# Patient Record
Sex: FEMALE | Race: WHITE | Employment: UNEMPLOYED | URBAN - METROPOLITAN AREA
[De-identification: names, ages, dates, MRNs, and addresses within clinical notes are randomized per-mention and may not be internally consistent; named-entity substitution may affect disease eponyms.]

---

## 2023-10-08 ENCOUNTER — HOSPITAL ENCOUNTER (EMERGENCY)
Age: 23
Discharge: HOME | End: 2023-10-08

## 2023-10-08 VITALS
RESPIRATION RATE: 16 BRPM | SYSTOLIC BLOOD PRESSURE: 135 MMHG | OXYGEN SATURATION: 100 % | BODY MASS INDEX: 28.25 KG/M2 | HEIGHT: 67 IN | TEMPERATURE: 98.7 F | HEART RATE: 100 BPM | DIASTOLIC BLOOD PRESSURE: 96 MMHG | WEIGHT: 180 LBS

## 2023-10-08 DIAGNOSIS — K08.89 PAIN, DENTAL: Primary | ICD-10-CM

## 2023-10-08 DIAGNOSIS — K02.9 DENTAL CARIES: ICD-10-CM

## 2023-10-08 PROCEDURE — 99283 EMERGENCY DEPT VISIT LOW MDM: CPT

## 2023-10-08 PROCEDURE — 6370000000 HC RX 637 (ALT 250 FOR IP)

## 2023-10-08 RX ORDER — HYDROCODONE BITARTRATE AND ACETAMINOPHEN 5; 325 MG/1; MG/1
1 TABLET ORAL EVERY 8 HOURS PRN
Qty: 10 TABLET | Refills: 0 | Status: SHIPPED | OUTPATIENT
Start: 2023-10-08 | End: 2023-10-11

## 2023-10-08 RX ORDER — PENICILLIN V POTASSIUM 250 MG/1
500 TABLET ORAL ONCE
Status: COMPLETED | OUTPATIENT
Start: 2023-10-08 | End: 2023-10-08

## 2023-10-08 RX ORDER — HYDROCODONE BITARTRATE AND ACETAMINOPHEN 5; 325 MG/1; MG/1
1 TABLET ORAL ONCE
Status: COMPLETED | OUTPATIENT
Start: 2023-10-08 | End: 2023-10-08

## 2023-10-08 RX ORDER — PENICILLIN V POTASSIUM 500 MG/1
500 TABLET ORAL 4 TIMES DAILY
Qty: 28 TABLET | Refills: 0 | Status: SHIPPED | OUTPATIENT
Start: 2023-10-08 | End: 2023-10-15

## 2023-10-08 RX ADMIN — PENICILLIN V POTASSIUM 500 MG: 250 TABLET, FILM COATED ORAL at 17:24

## 2023-10-08 RX ADMIN — HYDROCODONE BITARTRATE AND ACETAMINOPHEN 1 TABLET: 5; 325 TABLET ORAL at 17:24

## 2023-10-08 ASSESSMENT — PAIN SCALES - GENERAL
PAINLEVEL_OUTOF10: 6
PAINLEVEL_OUTOF10: 10
PAINLEVEL_OUTOF10: 9

## 2023-10-08 ASSESSMENT — PAIN DESCRIPTION - ONSET: ONSET: ON-GOING

## 2023-10-08 ASSESSMENT — PAIN DESCRIPTION - ORIENTATION: ORIENTATION: RIGHT;LEFT

## 2023-10-08 ASSESSMENT — PAIN DESCRIPTION - DESCRIPTORS
DESCRIPTORS: SHARP
DESCRIPTORS: ACHING

## 2023-10-08 ASSESSMENT — PAIN - FUNCTIONAL ASSESSMENT
PAIN_FUNCTIONAL_ASSESSMENT: 0-10
PAIN_FUNCTIONAL_ASSESSMENT: 0-10

## 2023-10-08 ASSESSMENT — ENCOUNTER SYMPTOMS
ABDOMINAL PAIN: 0
BACK PAIN: 0
SHORTNESS OF BREATH: 0
SORE THROAT: 0
NAUSEA: 0
COUGH: 0
VOMITING: 0
DIARRHEA: 0

## 2023-10-08 ASSESSMENT — PAIN DESCRIPTION - LOCATION
LOCATION: TEETH
LOCATION: TEETH
LOCATION: JAW

## 2023-10-08 ASSESSMENT — PAIN DESCRIPTION - FREQUENCY: FREQUENCY: CONTINUOUS

## 2023-10-08 ASSESSMENT — PAIN DESCRIPTION - PAIN TYPE: TYPE: ACUTE PAIN

## 2023-10-08 ASSESSMENT — LIFESTYLE VARIABLES
HOW OFTEN DO YOU HAVE A DRINK CONTAINING ALCOHOL: MONTHLY OR LESS
HOW MANY STANDARD DRINKS CONTAINING ALCOHOL DO YOU HAVE ON A TYPICAL DAY: 1 OR 2

## 2023-10-08 NOTE — DISCHARGE INSTRUCTIONS
Follow-up with dental specialist.  Return to emergency department for any new or worsening symptoms.

## 2023-10-08 NOTE — ED TRIAGE NOTES
Pt presents with oral pain/swelling x 2 weeks. Pt has been alternating tylenol/motrin for pain with no symptom relief. Pt tearful and describes the pain as shooting that is aggravated with heat/cold. Pt concerned for dental abcess because of gum swelling, however, reports no drainage, fever, or history. Yasmin Mckay

## 2023-10-08 NOTE — ED NOTES
Nursing Adult Assessment    General Appearance  [x] Facial Expressions, extremities, & body posture are relaxed. [] Exceptions:    Cognitive  [x] Alert, make eye contact when prompted. [x] Oriented to person, place, & situation. [] Exceptions:    Respiratory  [x] Unlabored breathing   [x] Speaks in clear and complete sentences   [x] Chest expansion is symmetrical with breaths   [x] Breath sounds are clear bilaterally. [x] Exceptions:Bilateral dental pain with minor swelling. Cardiovascular  [x] Regular apical heart sounds   [x] Peripheral pulses are palpable   [x] Capillary refill < 3 seconds in all extremities. [] Exceptions:    Abdomen  [x] Non-tender   [x] Non-distended   [x] Bowel sounds x4 quadrants.  [] Exceptions:    Skin  [x] Color appropriate for ethnicity   [x] No rash or discoloration present at the area(s) of complaint   [x] Warm and dry to touch. [] Exceptions:   [x] Non-tender   [x] Normal range of motion   [x] Normal sensation   [x] Normal Appearance, No Edema.   [] Exceptions:      Madelaine Salgado RN  10/08/23 9900

## 2023-12-30 ENCOUNTER — APPOINTMENT (OUTPATIENT)
Dept: RADIOLOGY | Facility: HOSPITAL | Age: 23
End: 2023-12-30
Payer: MEDICAID

## 2023-12-30 ENCOUNTER — HOSPITAL ENCOUNTER (EMERGENCY)
Facility: HOSPITAL | Age: 23
Discharge: HOME | End: 2023-12-30
Payer: MEDICAID

## 2023-12-30 VITALS
TEMPERATURE: 98.2 F | RESPIRATION RATE: 18 BRPM | HEART RATE: 89 BPM | BODY MASS INDEX: 29.19 KG/M2 | OXYGEN SATURATION: 97 % | HEIGHT: 67 IN | SYSTOLIC BLOOD PRESSURE: 127 MMHG | DIASTOLIC BLOOD PRESSURE: 63 MMHG | WEIGHT: 186 LBS

## 2023-12-30 DIAGNOSIS — N30.01 ACUTE CYSTITIS WITH HEMATURIA: Primary | ICD-10-CM

## 2023-12-30 DIAGNOSIS — R10.9 ACUTE RIGHT FLANK PAIN: ICD-10-CM

## 2023-12-30 LAB
ALBUMIN SERPL BCP-MCNC: 4.4 G/DL (ref 3.4–5)
ALP SERPL-CCNC: 61 U/L (ref 33–110)
ALT SERPL W P-5'-P-CCNC: 7 U/L (ref 7–45)
ANION GAP SERPL CALC-SCNC: 13 MMOL/L (ref 10–20)
APPEARANCE UR: ABNORMAL
AST SERPL W P-5'-P-CCNC: 13 U/L (ref 9–39)
BACTERIA #/AREA URNS AUTO: ABNORMAL /HPF
BASOPHILS # BLD AUTO: 0.02 X10*3/UL (ref 0–0.1)
BASOPHILS NFR BLD AUTO: 0.3 %
BILIRUB SERPL-MCNC: 0.4 MG/DL (ref 0–1.2)
BILIRUB UR STRIP.AUTO-MCNC: NEGATIVE MG/DL
BUN SERPL-MCNC: 10 MG/DL (ref 6–23)
CALCIUM SERPL-MCNC: 9 MG/DL (ref 8.6–10.3)
CHLORIDE SERPL-SCNC: 100 MMOL/L (ref 98–107)
CO2 SERPL-SCNC: 26 MMOL/L (ref 21–32)
COLOR UR: YELLOW
CREAT SERPL-MCNC: 0.85 MG/DL (ref 0.5–1.05)
EOSINOPHIL # BLD AUTO: 0.02 X10*3/UL (ref 0–0.7)
EOSINOPHIL NFR BLD AUTO: 0.3 %
ERYTHROCYTE [DISTWIDTH] IN BLOOD BY AUTOMATED COUNT: 12 % (ref 11.5–14.5)
GFR SERPL CREATININE-BSD FRML MDRD: >90 ML/MIN/1.73M*2
GLUCOSE SERPL-MCNC: 96 MG/DL (ref 74–99)
GLUCOSE UR STRIP.AUTO-MCNC: NEGATIVE MG/DL
HCG UR QL IA.RAPID: NEGATIVE
HCT VFR BLD AUTO: 35 % (ref 36–46)
HGB BLD-MCNC: 12.5 G/DL (ref 12–16)
IMM GRANULOCYTES # BLD AUTO: 0.02 X10*3/UL (ref 0–0.7)
IMM GRANULOCYTES NFR BLD AUTO: 0.3 % (ref 0–0.9)
KETONES UR STRIP.AUTO-MCNC: ABNORMAL MG/DL
LEUKOCYTE ESTERASE UR QL STRIP.AUTO: ABNORMAL
LYMPHOCYTES # BLD AUTO: 1.97 X10*3/UL (ref 1.2–4.8)
LYMPHOCYTES NFR BLD AUTO: 25.6 %
MCH RBC QN AUTO: 33.1 PG (ref 26–34)
MCHC RBC AUTO-ENTMCNC: 35.7 G/DL (ref 32–36)
MCV RBC AUTO: 93 FL (ref 80–100)
MONOCYTES # BLD AUTO: 0.93 X10*3/UL (ref 0.1–1)
MONOCYTES NFR BLD AUTO: 12.1 %
MUCOUS THREADS #/AREA URNS AUTO: ABNORMAL /LPF
NEUTROPHILS # BLD AUTO: 4.73 X10*3/UL (ref 1.2–7.7)
NEUTROPHILS NFR BLD AUTO: 61.4 %
NITRITE UR QL STRIP.AUTO: POSITIVE
NRBC BLD-RTO: 0 /100 WBCS (ref 0–0)
PH UR STRIP.AUTO: 6 [PH]
PLATELET # BLD AUTO: 233 X10*3/UL (ref 150–450)
POTASSIUM SERPL-SCNC: 3.3 MMOL/L (ref 3.5–5.3)
PROT SERPL-MCNC: 8.1 G/DL (ref 6.4–8.2)
PROT UR STRIP.AUTO-MCNC: ABNORMAL MG/DL
RBC # BLD AUTO: 3.78 X10*6/UL (ref 4–5.2)
RBC # UR STRIP.AUTO: ABNORMAL /UL
RBC #/AREA URNS AUTO: ABNORMAL /HPF
SODIUM SERPL-SCNC: 136 MMOL/L (ref 136–145)
SP GR UR STRIP.AUTO: 1.01
SQUAMOUS #/AREA URNS AUTO: ABNORMAL /HPF
UROBILINOGEN UR STRIP.AUTO-MCNC: 4 MG/DL
WBC # BLD AUTO: 7.7 X10*3/UL (ref 4.4–11.3)
WBC #/AREA URNS AUTO: >50 /HPF
WBC CLUMPS #/AREA URNS AUTO: ABNORMAL /HPF

## 2023-12-30 PROCEDURE — 96365 THER/PROPH/DIAG IV INF INIT: CPT

## 2023-12-30 PROCEDURE — 81001 URINALYSIS AUTO W/SCOPE: CPT | Performed by: PHYSICIAN ASSISTANT

## 2023-12-30 PROCEDURE — 96361 HYDRATE IV INFUSION ADD-ON: CPT

## 2023-12-30 PROCEDURE — 96375 TX/PRO/DX INJ NEW DRUG ADDON: CPT

## 2023-12-30 PROCEDURE — 85025 COMPLETE CBC W/AUTO DIFF WBC: CPT | Performed by: PHYSICIAN ASSISTANT

## 2023-12-30 PROCEDURE — 81025 URINE PREGNANCY TEST: CPT | Performed by: PHYSICIAN ASSISTANT

## 2023-12-30 PROCEDURE — 36415 COLL VENOUS BLD VENIPUNCTURE: CPT | Performed by: PHYSICIAN ASSISTANT

## 2023-12-30 PROCEDURE — 74176 CT ABD & PELVIS W/O CONTRAST: CPT | Performed by: RADIOLOGY

## 2023-12-30 PROCEDURE — 99284 EMERGENCY DEPT VISIT MOD MDM: CPT | Mod: 25

## 2023-12-30 PROCEDURE — 2500000004 HC RX 250 GENERAL PHARMACY W/ HCPCS (ALT 636 FOR OP/ED): Performed by: PHYSICIAN ASSISTANT

## 2023-12-30 PROCEDURE — 87086 URINE CULTURE/COLONY COUNT: CPT | Mod: ELYLAB | Performed by: PHYSICIAN ASSISTANT

## 2023-12-30 PROCEDURE — 80053 COMPREHEN METABOLIC PANEL: CPT | Performed by: PHYSICIAN ASSISTANT

## 2023-12-30 PROCEDURE — 74176 CT ABD & PELVIS W/O CONTRAST: CPT

## 2023-12-30 RX ORDER — MORPHINE SULFATE 4 MG/ML
4 INJECTION, SOLUTION INTRAMUSCULAR; INTRAVENOUS ONCE
Status: COMPLETED | OUTPATIENT
Start: 2023-12-30 | End: 2023-12-30

## 2023-12-30 RX ORDER — CEFTRIAXONE 1 G/50ML
1 INJECTION, SOLUTION INTRAVENOUS ONCE
Status: COMPLETED | OUTPATIENT
Start: 2023-12-30 | End: 2023-12-30

## 2023-12-30 RX ORDER — CEPHALEXIN 500 MG/1
500 CAPSULE ORAL 4 TIMES DAILY
Qty: 28 CAPSULE | Refills: 0 | Status: SHIPPED | OUTPATIENT
Start: 2023-12-30 | End: 2024-01-06

## 2023-12-30 RX ORDER — ONDANSETRON HYDROCHLORIDE 2 MG/ML
4 INJECTION, SOLUTION INTRAVENOUS ONCE
Status: COMPLETED | OUTPATIENT
Start: 2023-12-30 | End: 2023-12-30

## 2023-12-30 RX ORDER — KETOROLAC TROMETHAMINE 30 MG/ML
15 INJECTION, SOLUTION INTRAMUSCULAR; INTRAVENOUS ONCE
Status: COMPLETED | OUTPATIENT
Start: 2023-12-30 | End: 2023-12-30

## 2023-12-30 RX ORDER — NAPROXEN 500 MG/1
500 TABLET ORAL
Qty: 30 TABLET | Refills: 0 | Status: SHIPPED | OUTPATIENT
Start: 2023-12-30 | End: 2024-01-14

## 2023-12-30 RX ADMIN — CEFTRIAXONE SODIUM 1 G: 1 INJECTION, SOLUTION INTRAVENOUS at 21:31

## 2023-12-30 RX ADMIN — MORPHINE SULFATE 4 MG: 4 INJECTION, SOLUTION INTRAMUSCULAR; INTRAVENOUS at 20:25

## 2023-12-30 RX ADMIN — KETOROLAC TROMETHAMINE 15 MG: 30 INJECTION, SOLUTION INTRAMUSCULAR; INTRAVENOUS at 21:31

## 2023-12-30 RX ADMIN — ONDANSETRON 4 MG: 2 INJECTION INTRAMUSCULAR; INTRAVENOUS at 20:26

## 2023-12-30 RX ADMIN — SODIUM CHLORIDE 1000 ML: 9 INJECTION, SOLUTION INTRAVENOUS at 21:32

## 2023-12-30 RX ADMIN — SODIUM CHLORIDE, POTASSIUM CHLORIDE, SODIUM LACTATE AND CALCIUM CHLORIDE 1000 ML: 600; 310; 30; 20 INJECTION, SOLUTION INTRAVENOUS at 20:26

## 2023-12-30 ASSESSMENT — COLUMBIA-SUICIDE SEVERITY RATING SCALE - C-SSRS
1. IN THE PAST MONTH, HAVE YOU WISHED YOU WERE DEAD OR WISHED YOU COULD GO TO SLEEP AND NOT WAKE UP?: NO
6. HAVE YOU EVER DONE ANYTHING, STARTED TO DO ANYTHING, OR PREPARED TO DO ANYTHING TO END YOUR LIFE?: NO
2. HAVE YOU ACTUALLY HAD ANY THOUGHTS OF KILLING YOURSELF?: NO

## 2023-12-30 ASSESSMENT — PAIN DESCRIPTION - PAIN TYPE: TYPE: ACUTE PAIN

## 2023-12-30 ASSESSMENT — PAIN - FUNCTIONAL ASSESSMENT
PAIN_FUNCTIONAL_ASSESSMENT: 0-10
PAIN_FUNCTIONAL_ASSESSMENT: 0-10

## 2023-12-30 ASSESSMENT — PAIN DESCRIPTION - LOCATION
LOCATION: BACK
LOCATION: ABDOMEN

## 2023-12-30 ASSESSMENT — LIFESTYLE VARIABLES
REASON UNABLE TO ASSESS: NO
EVER FELT BAD OR GUILTY ABOUT YOUR DRINKING: NO
EVER HAD A DRINK FIRST THING IN THE MORNING TO STEADY YOUR NERVES TO GET RID OF A HANGOVER: NO
HAVE YOU EVER FELT YOU SHOULD CUT DOWN ON YOUR DRINKING: NO
HAVE PEOPLE ANNOYED YOU BY CRITICIZING YOUR DRINKING: NO

## 2023-12-30 ASSESSMENT — PAIN DESCRIPTION - ORIENTATION: ORIENTATION: RIGHT

## 2023-12-30 ASSESSMENT — PAIN SCALES - GENERAL
PAINLEVEL_OUTOF10: 7
PAINLEVEL_OUTOF10: 4

## 2023-12-30 ASSESSMENT — PAIN DESCRIPTION - DESCRIPTORS: DESCRIPTORS: DULL;SHARP

## 2023-12-31 LAB — HOLD SPECIMEN: NORMAL

## 2023-12-31 NOTE — ED PROVIDER NOTES
HPI   Chief Complaint   Patient presents with   • Back Pain     Patient complains of sharp right lower back pain for 1 week. Unknown is she injured it. Denies urinary issues.        The patient is a pleasant 23-year-old female presents emergency room with chief plaint of right flank pain that started 1 week ago.  Patient reports that the pain has been constant is described as sharp and stabbing it does get better with some ibuprofen and heating pad but never completely goes away.  Sometimes the patient is worse with movement of the times the patient states the pain is constant no matter what she does.  For the past 24 hours patient states the pain became so severe that she actually vomited several times.  The pain does not radiate more towards her right flank and towards the groin.  She denies any history of kidney stones, denies any gross hematuria, dysuria, frequency of urination, pyuria, vaginal bleeding or discharge.  She currently rates her flank pain 8 out of a 10.  She was driven the hospital by family member.  Her last dose of ibuprofen was earlier this morning.      History provided by:  Patient and medical records   used: No                        [place] Coma Scale Score: 15                  Patient History   History reviewed. No pertinent past medical history.  History reviewed. No pertinent surgical history.  No family history on file.  Social History     Tobacco Use   • Smoking status: Not on file   • Smokeless tobacco: Not on file   Substance Use Topics   • Alcohol use: Not on file   • Drug use: Not on file       Physical Exam   ED Triage Vitals [12/30/23 1902]   Temp Heart Rate Resp BP   36.8 °C (98.2 °F) 89 18 127/63      SpO2 Temp Source Heart Rate Source Patient Position   97 % Temporal Monitor Sitting      BP Location FiO2 (%)     Right arm --       Physical Exam  Vitals and nursing note reviewed.   Constitutional:       General: She is awake.      Appearance: Normal  appearance. She is well-developed and normal weight. She is not ill-appearing, toxic-appearing or diaphoretic.   HENT:      Head: Normocephalic and atraumatic.      Right Ear: Tympanic membrane, ear canal and external ear normal.      Left Ear: Tympanic membrane, ear canal and external ear normal.      Nose: Nose normal.      Mouth/Throat:      Mouth: Mucous membranes are moist.      Pharynx: Oropharynx is clear.   Eyes:      Extraocular Movements: Extraocular movements intact.      Conjunctiva/sclera: Conjunctivae normal.      Pupils: Pupils are equal, round, and reactive to light.   Cardiovascular:      Rate and Rhythm: Normal rate and regular rhythm.      Pulses: Normal pulses.      Heart sounds: Normal heart sounds.   Pulmonary:      Effort: Pulmonary effort is normal.      Breath sounds: Normal breath sounds. No wheezing, rhonchi or rales.   Abdominal:      General: Abdomen is flat. Bowel sounds are normal. There is no distension or abdominal bruit. There are no signs of injury.      Palpations: Abdomen is soft. There is no shifting dullness or mass.      Tenderness: There is abdominal tenderness in the right lower quadrant. There is right CVA tenderness. There is no guarding.       Musculoskeletal:         General: No swelling or tenderness. Normal range of motion.      Cervical back: Normal range of motion and neck supple.   Skin:     General: Skin is warm and dry.      Capillary Refill: Capillary refill takes less than 2 seconds.      Findings: No rash.   Neurological:      General: No focal deficit present.      Mental Status: She is alert and oriented to person, place, and time. Mental status is at baseline.   Psychiatric:         Mood and Affect: Mood normal.         Behavior: Behavior normal. Behavior is cooperative.         Thought Content: Thought content normal.         Judgment: Judgment normal.         ED Course & MDM   Diagnoses as of 12/30/23 2233   Acute cystitis with hematuria   Acute right  flank pain       Medical Decision Making  ED course: Initial vital signs temperature 36.8 pulse 89 respiratory 18 /63 pulse ox was 97% on room air patient was given a liter of LR, she received Zofran 4 mg IV push, morphine 4 mg IV push.    CBC shows a white count of 7.7 hemoglobin 12.5 hematocrit 35 platelet count was 233.  General chemistry potassium 3.3 otherwise unremarkable normal LFTs urine hCG was negative urinalysis shows hazy appearance moderate blood trace ketones 4 urobilinogen positive nitrite moderate leukocyte esterase greater than 50 white cells 4+ bacteria concerning for UTI to the 25 squamous epithelial cells.  The patient received a gram of Rocephin IV piggyback.  Noncontrast CT scan of the abdomen pelvis shows no acute abnormality in the abdomen or pelvis, no evidence of renal or ureteral calculus or evidence of hydroureteronephrosis.  I rounded on the patient to discuss results of the workup and repeat exam she is resting comfortably.  Initial impression is flank pain with a UTI she was discharged home with prescription for Keflex, naproxen and recommend close follow-up with her PCP.  She was advised to stay hydrated and encouraged to return with any concerns or worsening of symptoms all questions answered prior to discharge        Procedure  Procedures     Curt Montejo PA-C  12/30/23 5336

## 2024-01-02 LAB — BACTERIA UR CULT: ABNORMAL

## 2024-01-03 ENCOUNTER — TELEPHONE (OUTPATIENT)
Dept: PHARMACY | Facility: HOSPITAL | Age: 24
End: 2024-01-03
Payer: MEDICAID

## 2024-01-03 NOTE — PROGRESS NOTES
EDPD Note: Antibiotics Reviewed and Warranted    Contacted Ms. Nadiya Woodard regarding a positive Escherichia coli urine culture/result that was taken during their recent emergency room visit. I completed education with patient . The patient is being treated appropriately with cephalexin 500 m capsule PO QID x 7 days per the resulting urine culture. Patient presented to the ED with severe flank pain occurring over several days with vomiting. No other systemic symptoms noted. CT performed in the ED, and patient diagnosed with acute cystitis accompanied by right flank pain (no diagnosis of pyelonephritis given).     Patient indicated that they had started taking the prescribed antibiotic and symptoms are much better since leaving the ED. Denied worsening flank pain, nausea, vomiting, or any development of new systemic symptoms/urinary discomfort. Due to patient's drastic improvement in symptoms, antibiotic will be kept as prescribed. Patient had no further questions regarding their care, and declined further education from the pharmacy team. Patient to return to the ED or contact their PCP with worsening/changing symptoms. Additional prescriptions for naproxen 500 mg were also given at discharge from the ED.     Susceptibility data from last 90 days.  Collected Specimen Info Organism Amoxicillin/Clavulanate Ampicillin Ampicillin/Sulbactam Cefazolin Cefazolin (uncomplicated UTIs only) Ceftriaxone Ciprofloxacin Gentamicin Nitrofurantoin Piperacillin/Tazobactam   23 Urine from Clean Catch/Voided Escherichia coli S R R I S S S S S S     Collected Specimen Info Organism Trimethoprim/Sulfamethoxazole   23 Urine from Clean Catch/Voided Escherichia coli S        No further follow up needed from EDPD Team.     Jimena Dobbs, MarshalD

## 2024-02-14 ENCOUNTER — HOSPITAL ENCOUNTER (EMERGENCY)
Facility: HOSPITAL | Age: 24
Discharge: HOME | End: 2024-02-14
Attending: EMERGENCY MEDICINE
Payer: MEDICAID

## 2024-02-14 VITALS
WEIGHT: 160 LBS | HEART RATE: 88 BPM | BODY MASS INDEX: 25.11 KG/M2 | DIASTOLIC BLOOD PRESSURE: 78 MMHG | RESPIRATION RATE: 19 BRPM | TEMPERATURE: 97.9 F | HEIGHT: 67 IN | SYSTOLIC BLOOD PRESSURE: 138 MMHG | OXYGEN SATURATION: 97 %

## 2024-02-14 DIAGNOSIS — K02.9 PAIN DUE TO DENTAL CARIES: Primary | ICD-10-CM

## 2024-02-14 PROCEDURE — 2500000001 HC RX 250 WO HCPCS SELF ADMINISTERED DRUGS (ALT 637 FOR MEDICARE OP): Performed by: EMERGENCY MEDICINE

## 2024-02-14 PROCEDURE — 99283 EMERGENCY DEPT VISIT LOW MDM: CPT | Performed by: EMERGENCY MEDICINE

## 2024-02-14 RX ORDER — ACETAMINOPHEN 500 MG
1000 TABLET ORAL EVERY 8 HOURS PRN
Qty: 30 TABLET | Refills: 0 | Status: SHIPPED | OUTPATIENT
Start: 2024-02-14 | End: 2024-02-24

## 2024-02-14 RX ORDER — LIDOCAINE HYDROCHLORIDE 40 MG/ML
0.5 SOLUTION TOPICAL ONCE
Status: COMPLETED | OUTPATIENT
Start: 2024-02-14 | End: 2024-02-14

## 2024-02-14 RX ORDER — NAPROXEN 250 MG/1
500 TABLET ORAL ONCE
Status: COMPLETED | OUTPATIENT
Start: 2024-02-14 | End: 2024-02-14

## 2024-02-14 RX ORDER — LIDOCAINE HYDROCHLORIDE 20 MG/ML
1.25 SOLUTION OROPHARYNGEAL ONCE
Status: DISCONTINUED | OUTPATIENT
Start: 2024-02-14 | End: 2024-02-14

## 2024-02-14 RX ORDER — AMOXICILLIN AND CLAVULANATE POTASSIUM 875; 125 MG/1; MG/1
1 TABLET, FILM COATED ORAL EVERY 12 HOURS
Qty: 14 TABLET | Refills: 0 | Status: SHIPPED | OUTPATIENT
Start: 2024-02-14 | End: 2024-02-21

## 2024-02-14 RX ORDER — NAPROXEN SODIUM 550 MG/1
550 TABLET ORAL 2 TIMES DAILY PRN
Qty: 20 TABLET | Refills: 0 | Status: SHIPPED | OUTPATIENT
Start: 2024-02-14 | End: 2024-02-24

## 2024-02-14 RX ADMIN — LIDOCAINE HYDROCHLORIDE 0.5 ML: 40 SOLUTION ORAL at 16:47

## 2024-02-14 RX ADMIN — NAPROXEN 500 MG: 250 TABLET ORAL at 16:46

## 2024-02-14 ASSESSMENT — COLUMBIA-SUICIDE SEVERITY RATING SCALE - C-SSRS
6. HAVE YOU EVER DONE ANYTHING, STARTED TO DO ANYTHING, OR PREPARED TO DO ANYTHING TO END YOUR LIFE?: NO
1. IN THE PAST MONTH, HAVE YOU WISHED YOU WERE DEAD OR WISHED YOU COULD GO TO SLEEP AND NOT WAKE UP?: NO
2. HAVE YOU ACTUALLY HAD ANY THOUGHTS OF KILLING YOURSELF?: NO

## 2024-02-14 ASSESSMENT — PAIN DESCRIPTION - PAIN TYPE
TYPE: ACUTE PAIN
TYPE: ACUTE PAIN

## 2024-02-14 ASSESSMENT — LIFESTYLE VARIABLES
EVER HAD A DRINK FIRST THING IN THE MORNING TO STEADY YOUR NERVES TO GET RID OF A HANGOVER: NO
HAVE YOU EVER FELT YOU SHOULD CUT DOWN ON YOUR DRINKING: NO
HAVE PEOPLE ANNOYED YOU BY CRITICIZING YOUR DRINKING: NO
EVER FELT BAD OR GUILTY ABOUT YOUR DRINKING: NO

## 2024-02-14 ASSESSMENT — PAIN SCALES - GENERAL
PAINLEVEL_OUTOF10: 8
PAINLEVEL_OUTOF10: 5 - MODERATE PAIN

## 2024-02-14 ASSESSMENT — PAIN DESCRIPTION - LOCATION
LOCATION: JAW
LOCATION: TEETH

## 2024-02-14 ASSESSMENT — PAIN - FUNCTIONAL ASSESSMENT
PAIN_FUNCTIONAL_ASSESSMENT: 0-10
PAIN_FUNCTIONAL_ASSESSMENT: 0-10

## 2024-02-14 ASSESSMENT — PAIN DESCRIPTION - ORIENTATION: ORIENTATION: LEFT

## 2024-02-14 ASSESSMENT — PAIN DESCRIPTION - FREQUENCY: FREQUENCY: CONSTANT/CONTINUOUS

## 2024-02-14 ASSESSMENT — PAIN DESCRIPTION - DESCRIPTORS: DESCRIPTORS: SHOOTING

## 2024-02-14 NOTE — ED PROVIDER NOTES
23-year-old female presents emergency department with chief complaint of a left lower posterior dental pain.  She describes the pain is aching throbbing discomfort in her left jaw and states that it radiates into her left ear some.  She states the symptoms have been present for 2 days.  She reports she believes it is due to her teeth on that side.  Denies any injury or trauma to the area.  No fevers, coughing, or congestion.  She denies any chest pain or difficulty breathing.  No abdominal pain, vomiting, dysuria, diarrhea, constipation, black or bloody stools.  She does admit to some associated nausea.  She reports she has been taking Tylenol ibuprofen combo medicine without much relief.  She admits to history of vaping denies any other illicit drug use or regular alcohol use.  Denies any significant past medical history states she does not take any medications regularly.  Patient denies concern for pregnancy      History provided by:  Patient   used: No           ------------------------------------------------------------------------------------------------------------------------------------------    VS: As documented in the triage note and EMR flowsheet from this visit were reviewed.    Review of Systems  Please see [facility]  Nursing notes reviewed and confirmed by me.  Chart review performed including medications, allergies, and medical, surgical, and family history  Visit Vitals  /78   Pulse 88   Temp 36.6 °C (97.9 °F) (Temporal)   Resp 19     Physical Exam  Vitals and nursing note reviewed.   Constitutional:       General: She is not in acute distress.     Appearance: Normal appearance. She is not ill-appearing.   HENT:      Head: Normocephalic and atraumatic.        Right Ear: Tympanic membrane and external ear normal.      Left Ear: External ear normal.      Ears:      Comments: Left tympanic membrane appreciated to have effusion     Nose: Nose normal. No congestion or  rhinorrhea.      Mouth/Throat:      Mouth: Mucous membranes are moist.      Dentition: Dental caries present. No dental abscesses.      Pharynx: Uvula midline. No oropharyngeal exudate or posterior oropharyngeal erythema.        Comments: Posterior left 2 teeth tender to palpation with dental erosion appreciated.  No periapical abscess.  No trismus.  Eyes:      Extraocular Movements: Extraocular movements intact.      Conjunctiva/sclera: Conjunctivae normal.      Pupils: Pupils are equal, round, and reactive to light.   Cardiovascular:      Rate and Rhythm: Normal rate and regular rhythm.      Pulses: Normal pulses.      Heart sounds: Normal heart sounds.   Pulmonary:      Effort: Pulmonary effort is normal. No respiratory distress.      Breath sounds: Normal breath sounds. No stridor. No wheezing, rhonchi or rales.   Abdominal:      General: There is no distension.      Palpations: Abdomen is soft.      Tenderness: There is no abdominal tenderness. There is no guarding or rebound.   Musculoskeletal:         General: No swelling or deformity. Normal range of motion.      Cervical back: Normal range of motion and neck supple. No rigidity.      Comments: No calf tenderness    Skin:     General: Skin is warm and dry.      Capillary Refill: Capillary refill takes less than 2 seconds.      Coloration: Skin is not jaundiced.      Findings: No rash.   Neurological:      General: No focal deficit present.      Mental Status: She is alert and oriented to person, place, and time.      Sensory: No sensory deficit.      Motor: No weakness.   Psychiatric:         Mood and Affect: Mood normal.         Behavior: Behavior normal.        No past medical history on file.   No past surgical history on file.   Social History     Socioeconomic History    Marital status: Single     Spouse name: Not on file    Number of children: Not on file    Years of education: Not on file    Highest education level: Not on file   Occupational History     Not on file   Tobacco Use    Smoking status: Not on file    Smokeless tobacco: Not on file   Substance and Sexual Activity    Alcohol use: Not on file    Drug use: Not on file    Sexual activity: Not on file   Other Topics Concern    Not on file   Social History Narrative    Not on file     Social Determinants of Health     Financial Resource Strain: Not on file   Food Insecurity: Not on file   Transportation Needs: Not on file   Physical Activity: Not on file   Stress: Not on file   Social Connections: Not on file   Intimate Partner Violence: Not on file   Housing Stability: Not on file      ------------------------------------------------------------------------------------------------------------------------------------------  No orders to display      Labs Reviewed - No data to display     Medical Decision Making  23-year-old female presents emergency department with complaints of left lower jaw and dental pain.  On my exam patient is appreciated to have what appears to be consistent with dental decay/dental caries of the posterior lower teeth.  On my exam patient is afebrile and nontoxic.  She appears clinically well-hydrated.  She does not have any trismus or clinical findings concerning for Wilmar's angina.  Patient is treated symptomatically with topical lidocaine and naproxen.  I did discuss with patient risk of medications with possible pregnancy.  Patient states that she is not concerned for pregnancy.  She is given Augmentin for home with Anaprox and advised to follow-up with dentist and primary care doctor.  We also discussed reasons to return to the ED.  Patient comfortable returning home.       Diagnoses as of 02/14/24 1717   Pain due to dental caries      1. Pain due to dental caries  amoxicillin-pot clavulanate (Augmentin) 875-125 mg tablet    naproxen sodium (Anaprox) 550 mg tablet    acetaminophen (Tylenol) 500 mg tablet         Procedures     This note was dictated using dragon software and  may contain errors related to dictation interpretation errors.      Edward Christine, DO  02/14/24 1983

## 2024-02-14 NOTE — DISCHARGE INSTRUCTIONS
Follow up with your primary care doctor and dentist. Return to the ER if symptoms worsen or change. Take medications as prescribed.  Dental rolls can be used by applying one roll to affected tooth/gum for 30 min every 8 hours as needed.

## 2025-04-05 ENCOUNTER — HOSPITAL ENCOUNTER (EMERGENCY)
Facility: HOSPITAL | Age: 25
Discharge: HOME | End: 2025-04-05
Payer: MEDICAID

## 2025-04-05 ENCOUNTER — APPOINTMENT (OUTPATIENT)
Dept: RADIOLOGY | Facility: HOSPITAL | Age: 25
End: 2025-04-05
Payer: MEDICAID

## 2025-04-05 VITALS
HEART RATE: 74 BPM | HEIGHT: 67 IN | RESPIRATION RATE: 18 BRPM | SYSTOLIC BLOOD PRESSURE: 119 MMHG | DIASTOLIC BLOOD PRESSURE: 75 MMHG | BODY MASS INDEX: 25.11 KG/M2 | TEMPERATURE: 97.2 F | OXYGEN SATURATION: 100 % | WEIGHT: 160 LBS

## 2025-04-05 DIAGNOSIS — S82.301A CLOSED FRACTURE OF DISTAL END OF RIGHT TIBIA, UNSPECIFIED FRACTURE MORPHOLOGY, INITIAL ENCOUNTER: Primary | ICD-10-CM

## 2025-04-05 PROCEDURE — 29515 APPLICATION SHORT LEG SPLINT: CPT | Mod: RT

## 2025-04-05 PROCEDURE — 2500000001 HC RX 250 WO HCPCS SELF ADMINISTERED DRUGS (ALT 637 FOR MEDICARE OP): Performed by: NURSE PRACTITIONER

## 2025-04-05 PROCEDURE — 73610 X-RAY EXAM OF ANKLE: CPT | Mod: RT

## 2025-04-05 PROCEDURE — 73610 X-RAY EXAM OF ANKLE: CPT | Mod: RIGHT SIDE | Performed by: RADIOLOGY

## 2025-04-05 PROCEDURE — 99283 EMERGENCY DEPT VISIT LOW MDM: CPT

## 2025-04-05 RX ORDER — HYDROCODONE BITARTRATE AND ACETAMINOPHEN 5; 325 MG/1; MG/1
1 TABLET ORAL EVERY 6 HOURS PRN
Qty: 12 TABLET | Refills: 0 | Status: SHIPPED | OUTPATIENT
Start: 2025-04-05

## 2025-04-05 RX ORDER — IBUPROFEN 600 MG/1
600 TABLET ORAL ONCE
Status: COMPLETED | OUTPATIENT
Start: 2025-04-05 | End: 2025-04-05

## 2025-04-05 RX ORDER — IBUPROFEN 600 MG/1
600 TABLET ORAL EVERY 6 HOURS PRN
Qty: 24 TABLET | Refills: 0 | Status: SHIPPED | OUTPATIENT
Start: 2025-04-05

## 2025-04-05 RX ADMIN — IBUPROFEN 600 MG: 600 TABLET ORAL at 13:43

## 2025-04-05 ASSESSMENT — PAIN - FUNCTIONAL ASSESSMENT: PAIN_FUNCTIONAL_ASSESSMENT: 0-10

## 2025-04-05 ASSESSMENT — LIFESTYLE VARIABLES
HAVE YOU EVER FELT YOU SHOULD CUT DOWN ON YOUR DRINKING: NO
HAVE PEOPLE ANNOYED YOU BY CRITICIZING YOUR DRINKING: NO
EVER FELT BAD OR GUILTY ABOUT YOUR DRINKING: NO
TOTAL SCORE: 0
EVER HAD A DRINK FIRST THING IN THE MORNING TO STEADY YOUR NERVES TO GET RID OF A HANGOVER: NO

## 2025-04-05 ASSESSMENT — PAIN SCALES - GENERAL: PAINLEVEL_OUTOF10: 8

## 2025-04-05 NOTE — ED PROVIDER NOTES
HPI   Chief Complaint   Patient presents with    Ankle Pain       24-year-old female presents emergency department, states she tripped, rolled her ankle, complaining of pain and swelling across her ankle.  Denies any additional injuries or complaints, no head injury or loss of consciousness, neck or back pain.  Denies any foot or knee pain.  States her foot does feel slightly tingly      History provided by:  Patient   used: No            Patient History   No past medical history on file.  No past surgical history on file.  No family history on file.  Social History     Tobacco Use    Smoking status: Not on file    Smokeless tobacco: Not on file   Substance Use Topics    Alcohol use: Not on file    Drug use: Not on file       Physical Exam   ED Triage Vitals [04/05/25 1339]   Temperature Heart Rate Respirations BP   36.2 °C (97.2 °F) 74 18 119/75      Pulse Ox Temp Source Heart Rate Source Patient Position   100 % Temporal Monitor --      BP Location FiO2 (%)     Right arm --       Physical Exam  Gen.: Vitals noted. No distress. Afebrile.    Cardiac: Regular rate rhythm. No murmur.   Pulmonary: Equal breath sounds bilaterally. No adventitious breath sounds.   Abdomen: Soft, nontender, nonsurgical. Normoactive bowel sounds.   Back: Nontender throughout.   Lower extremity: There is no tenderness over the medial malleolus. There is tenderness over the lateral malleolus. There is no tenderness over the anterior ankle mortise. The foot is nontender. The skin is intact. Is neurovascularly intact distally. The remainder of the extremity is nontender, specifically, nontender over the knee and fibular head.       ED Course & MDM   Diagnoses as of 04/05/25 1402   Closed fracture of distal end of right tibia, unspecified fracture morphology, initial encounter        Labs Reviewed - No data to display     XR ankle right 3+ views    (Results Pending)              No data recorded                            Medical Decision Making    Patient medicated ibuprofen for discomfort.  X-ray imaging, as interpreted by me, concerning for distal tibia fracture.    Discussed result with the patient, discussed immobilization, splint placement for nonweightbearing status with crutches.  Discussed close Ortho follow-up, scheduled for the Ortho clinic for Monday.    The correct placement of the splint/strap was confirmed.  Any necessary adjustments were made.  The patient´s neurovascular status is intact pre and post placement.      I personally supervised and inspected the splint/strap which was applied by  BERTHA Mejía. The extremity is appropriately immobilized. Patient neurovascularly intact before and after the splint application.        Patient be discharged home on ibuprofen and Norco, discussed ice and elevation, again nonweightbearing status.  Discussed return with any worsening symptoms or additional concern.    Procedure  Procedures     ALVIN Scott-CNP  04/05/25 0522

## 2025-04-07 ENCOUNTER — OFFICE VISIT (OUTPATIENT)
Dept: ORTHOPEDIC SURGERY | Facility: CLINIC | Age: 25
End: 2025-04-07
Payer: MEDICAID

## 2025-04-07 DIAGNOSIS — S82.51XA CLOSED AVULSION FRACTURE OF MEDIAL MALLEOLUS OF RIGHT TIBIA, INITIAL ENCOUNTER: Primary | ICD-10-CM

## 2025-04-07 DIAGNOSIS — S82.301A CLOSED FRACTURE OF DISTAL END OF RIGHT TIBIA, UNSPECIFIED FRACTURE MORPHOLOGY, INITIAL ENCOUNTER: ICD-10-CM

## 2025-04-07 PROCEDURE — 27760 CLTX MEDIAL ANKLE FX: CPT | Performed by: STUDENT IN AN ORGANIZED HEALTH CARE EDUCATION/TRAINING PROGRAM

## 2025-04-07 PROCEDURE — 99203 OFFICE O/P NEW LOW 30 MIN: CPT | Performed by: STUDENT IN AN ORGANIZED HEALTH CARE EDUCATION/TRAINING PROGRAM

## 2025-04-07 PROCEDURE — 99214 OFFICE O/P EST MOD 30 MIN: CPT | Mod: 57 | Performed by: STUDENT IN AN ORGANIZED HEALTH CARE EDUCATION/TRAINING PROGRAM

## 2025-04-07 NOTE — LETTER
April 7, 2025     Patient: Nadiya Woodard   YOB: 2000   Date of Visit: 4/7/2025       To Whom It May Concern:    It is my medical opinion that Nadiya Woodard may return to work on 04/28/25 .    If you have any questions or concerns, please don't hesitate to call.         Sincerely,        Duong Monroy,     CC: No Recipients

## 2025-04-07 NOTE — PROGRESS NOTES
"  Acute Injury New Patient Visit    HPI: Nadiya is a 24 y.o.female who presents today with new complaints of right ankle injury.  On Saturday, at the [facility], she had an inversion type injury.  She has pain laterally and medially.  She felt \"5 pops\".  She has significant swelling and bruising.  She was seen at [facility], and found to have a distal tibia fracture.  She denies any knee pain or swelling.  She denies any numbness tingling.    Plan: For this right nondisplaced distal tibia fracture, we will place her in a short leg cast, nonweightbearing, and follow-up in 10 days with repeat x-rays XIP, as long as she maintains good alignment at that time, will keep her in the cast for about 3 weeks.  Will order a knee scooter.  Continue crutches.  Provided her a work note to be off for the next 3 weeks.  Additionally, discussed conservative treatment measures including rest, ice, elevation, compression, and over-the-counter analgesia as needed and as appropriate.  Risks of NSAID use, steroid use, and muscle relaxers discussed in depth and considered in light of medical comorbidities.  Patient, and parent/guardian as applicable, understand agree with plan.  Follow-up: 10 days  X-rays on follow-up: Right ankle XIP      Assessment:   Problem List Items Addressed This Visit    None  Visit Diagnoses       Closed avulsion fracture of medial malleolus of right tibia, initial encounter    -  Primary    Relevant Orders    Miscellaneous DME    Closed fracture of distal end of right tibia, unspecified fracture morphology, initial encounter        Relevant Orders    Miscellaneous DME            Diagnostics: Reviewed all relevant imaging including x-ray, MRI, CT, and US.      Procedure:  Procedures    Physical Exam:  GENERAL:  No obvious acute distress.  NEURO:  Distally neurovascularly intact.  Sensation intact to light touch.  Extremity: Right ankle exam shows:  Skin is intact;  No erythema or warmth;  No clinical signs " of infection;  No pain over the lateral malleolus;  TENDER, swollen, bruised over the medial malleolus;  TENDER over the ATF, CF or PTF ligaments;  No pain over the deltoid ligament;  No pain over the Achilles tendon;  Negative Richards's test;  Negative squeeze test;  Negative anterior drawer test;  Negative talar tilt test;  No pain over the anterior process of the talus;  No pain over the talar dome;  No pain over the base of the fifth metatarsal bone;  No pain over the calcaneus;  No pain over the plantar aponeurosis;  No pain of the midfoot; and  Neurovascularly intact.    Orders Placed This Encounter    Miscellaneous DME      At the conclusion of the visit there were no further questions by the patient/family regarding their plan of care.  Patient was instructed to call or return with any issues, questions, or concerns regarding their injury and/or treatment plan described above.     04/07/25 at 5:44 PM - Duong Monroy DO    Office: [phone]    This note was prepared using voice recognition software.  The details of this note are correct and have been reviewed, and corrected to the best of my ability.  Some grammatical errors may persist related to the Dragon software.

## 2025-04-17 ENCOUNTER — OFFICE VISIT (OUTPATIENT)
Dept: ORTHOPEDIC SURGERY | Facility: CLINIC | Age: 25
End: 2025-04-17
Payer: MEDICAID

## 2025-04-17 ENCOUNTER — HOSPITAL ENCOUNTER (OUTPATIENT)
Dept: RADIOLOGY | Facility: HOSPITAL | Age: 25
Discharge: HOME | End: 2025-04-17
Payer: MEDICAID

## 2025-04-17 ENCOUNTER — APPOINTMENT (OUTPATIENT)
Dept: ORTHOPEDIC SURGERY | Facility: CLINIC | Age: 25
End: 2025-04-17
Payer: MEDICAID

## 2025-04-17 DIAGNOSIS — S82.51XA CLOSED AVULSION FRACTURE OF MEDIAL MALLEOLUS OF RIGHT TIBIA, INITIAL ENCOUNTER: ICD-10-CM

## 2025-04-17 DIAGNOSIS — S82.301A CLOSED FRACTURE OF DISTAL END OF RIGHT TIBIA, UNSPECIFIED FRACTURE MORPHOLOGY, INITIAL ENCOUNTER: ICD-10-CM

## 2025-04-17 DIAGNOSIS — S82.301A CLOSED FRACTURE OF DISTAL END OF RIGHT TIBIA, UNSPECIFIED FRACTURE MORPHOLOGY, INITIAL ENCOUNTER: Primary | ICD-10-CM

## 2025-04-17 PROCEDURE — L4361 PNEUMA/VAC WALK BOOT PRE OTS: HCPCS | Performed by: STUDENT IN AN ORGANIZED HEALTH CARE EDUCATION/TRAINING PROGRAM

## 2025-04-17 PROCEDURE — 99024 POSTOP FOLLOW-UP VISIT: CPT | Performed by: STUDENT IN AN ORGANIZED HEALTH CARE EDUCATION/TRAINING PROGRAM

## 2025-04-17 PROCEDURE — 73610 X-RAY EXAM OF ANKLE: CPT | Mod: RT

## 2025-04-17 NOTE — PROGRESS NOTES
"  Acute Injury New Patient Visit    HPI: Nadiya is a 24 y.o.female who presents today for follow-up of her right nondisplaced distal tibia fracture.  She has been doing well.  She states that the cast is actually loose today.  Has not been putting any weight on it.  Still has some mild pain, but no worsening pain.  Denies any pain or swelling about the knee.  Denies any numbness tingling.  Denies any signs infection.    On 4/7/2025, she presented with new complaints of right ankle injury.  On Saturday, at the [facility], she had an inversion type injury.  She has pain laterally and medially.  She felt \"5 pops\".  She has significant swelling and bruising.  She was seen at [place] ED, and found to have a distal tibia fracture.  She denies any knee pain or swelling.  She denies any numbness tingling.    Plan: Today, on 4/17/2025, will transition to adult boot as she has had significant loosening of the cast since last visit, as this will give her more support, but will remain nonweightbearing until follow-up.  Can come out for showering and skin care.  I would like her in the boot 99% of the time, however.    On 4/7/2025, for this right nondisplaced distal tibia fracture, we will place her in a short leg cast, nonweightbearing, and follow-up in 10 days with repeat x-rays XIP, as long as she maintains good alignment at that time, will keep her in the cast for about 3 weeks.  Will order a knee scooter.  Continue crutches.  Provided her a work note to be off for the next 3 weeks.  Additionally, discussed conservative treatment measures including rest, ice, elevation, compression, and over-the-counter analgesia as needed and as appropriate.  Risks of NSAID use, steroid use, and muscle relaxers discussed in depth and considered in light of medical comorbidities.  Patient, and parent/guardian as applicable, understand agree with plan.  Follow-up: 2 weeks  X-rays on follow-up: Right ankle       Assessment:   Problem List " Items Addressed This Visit    None  Visit Diagnoses         Closed fracture of distal end of right tibia, unspecified fracture morphology, initial encounter    -  Primary    Relevant Orders    XR ankle right 3+ views    Walking Boot Tall      Closed avulsion fracture of medial malleolus of right tibia, initial encounter        Relevant Orders    XR ankle right 3+ views    Walking Boot Tall              Diagnostics: Reviewed all relevant imaging including x-ray, MRI, CT, and US.      Procedure:  Procedures    Physical Exam:  GENERAL:  No obvious acute distress.  NEURO:  Distally neurovascularly intact.  Sensation intact to light touch.  Extremity: Right ankle exam shows:  Skin is intact;  No erythema or warmth;  No clinical signs of infection;  No pain over the lateral malleolus;  Improved TENDER, swollen, bruised over the medial malleolus;  Improved TENDER over the ATF, CF or PTF ligaments;  No pain over the deltoid ligament;  No pain over the Achilles tendon;  Negative Richards's test;  Negative squeeze test;  Negative anterior drawer test;  Negative talar tilt test;  No pain over the anterior process of the talus;  No pain over the talar dome;  No pain over the base of the fifth metatarsal bone;  No pain over the calcaneus;  No pain over the plantar aponeurosis;  No pain of the midfoot; and  Neurovascularly intact.    Orders Placed This Encounter    Walking Boot Tall    XR ankle right 3+ views      At the conclusion of the visit there were no further questions by the patient/family regarding their plan of care.  Patient was instructed to call or return with any issues, questions, or concerns regarding their injury and/or treatment plan described above.     04/17/25 at 3:58 PM - Duong Monroy DO    Office: [phone]    This note was prepared using voice recognition software.  The details of this note are correct and have been reviewed, and corrected to the best of my ability.  Some grammatical errors may  persist related to the Dragon software.

## 2025-05-01 ENCOUNTER — APPOINTMENT (OUTPATIENT)
Dept: ORTHOPEDIC SURGERY | Facility: CLINIC | Age: 25
End: 2025-05-01
Payer: MEDICAID

## 2025-05-01 ENCOUNTER — HOSPITAL ENCOUNTER (OUTPATIENT)
Dept: RADIOLOGY | Facility: HOSPITAL | Age: 25
Discharge: HOME | End: 2025-05-01
Payer: MEDICAID

## 2025-05-01 ENCOUNTER — OFFICE VISIT (OUTPATIENT)
Dept: ORTHOPEDIC SURGERY | Facility: CLINIC | Age: 25
End: 2025-05-01
Payer: MEDICAID

## 2025-05-01 DIAGNOSIS — S82.301A CLOSED FRACTURE OF DISTAL END OF RIGHT TIBIA, UNSPECIFIED FRACTURE MORPHOLOGY, INITIAL ENCOUNTER: ICD-10-CM

## 2025-05-01 PROCEDURE — 73610 X-RAY EXAM OF ANKLE: CPT | Mod: RT

## 2025-05-01 PROCEDURE — 99024 POSTOP FOLLOW-UP VISIT: CPT | Performed by: STUDENT IN AN ORGANIZED HEALTH CARE EDUCATION/TRAINING PROGRAM

## 2025-05-01 NOTE — PROGRESS NOTES
Acute Injury Established Patient Visit    Patient ID: Nadiya Woodard is a 24 y.o. female who presents for No chief complaint on file..  History of Present Illness  The patient is a 24-year-old female who presents for a 3-week follow-up of a right nondisplaced distal tibia fracture.    Right Nondisplaced Distal Tibia Fracture  - In tall boot, nonweightbearing  - Cast was loose at last visit, transitioned to tall boot  - General improvement with reduced swelling and bruising  - Some residual symptoms  - Adhering to nonweightbearing regimen, using crutches  - Occasionally removes boot  - Mild discomfort in contralateral knee managed with knee scooter    Supplemental information: Not engaged in work activities.       Assessment & Plan  1. Right nondisplaced distal tibia fracture:  - X-rays show healing with proper alignment and callus formation  - Reduced swelling and bruising, no tenderness  - Nonweightbearing with crutches and tall boot maintained  - Continue nonweightbearing for 2 to 2.5 weeks  - Consider transition to lace-up ankle brace at next visit    Follow-up:  - Follow up in 2 weeks with repeat x-rays of the right ankle, 3 view       Assessment:   Problem List Items Addressed This Visit    None  Visit Diagnoses         Closed fracture of distal end of right tibia, unspecified fracture morphology, initial encounter        Relevant Orders    XR ankle right 3+ views            Diagnostics: Reviewed all relevant imaging including x-ray, MRI, CT, and US.    Results  Imaging   - X-ray of right distal tibia: Healing with proper alignment, callus formation.       Procedure:  Procedures    Physical Exam  - Integumentary: Mild bruising at base of toes, medial ankle improving, reduced swelling  - Musculoskeletal: Right Ankle: Callus formation on bone       Orders Placed This Encounter    XR ankle right 3+ views      At the conclusion of the visit there were no further questions by the patient/family regarding their  plan of care.  Patient was instructed to call or return with any issues, questions, or concerns regarding their injury and/or treatment plan described above.     05/01/25 at 4:22 PM - Duong Monroy DO    Office: [phone]    This note was prepared using voice recognition software.  The details of this note are correct and have been reviewed, and corrected to the best of my ability.  Some grammatical errors may persist related to the Dragon software.  This medical note was created with the assistance of artificial intelligence (AI) for documentation purposes. The content has been reviewed and confirmed by the healthcare provider for accuracy and completeness. Patient consented to the use of audio recording and use of AI during their visit.

## 2025-05-08 ENCOUNTER — APPOINTMENT (OUTPATIENT)
Dept: RADIOLOGY | Facility: HOSPITAL | Age: 25
End: 2025-05-08
Payer: MEDICAID

## 2025-05-08 ENCOUNTER — APPOINTMENT (OUTPATIENT)
Dept: CARDIOLOGY | Facility: HOSPITAL | Age: 25
End: 2025-05-08
Payer: MEDICAID

## 2025-05-08 ENCOUNTER — HOSPITAL ENCOUNTER (EMERGENCY)
Facility: HOSPITAL | Age: 25
Discharge: HOME | End: 2025-05-08
Attending: EMERGENCY MEDICINE
Payer: MEDICAID

## 2025-05-08 VITALS
HEART RATE: 64 BPM | SYSTOLIC BLOOD PRESSURE: 120 MMHG | BODY MASS INDEX: 25.11 KG/M2 | TEMPERATURE: 98.2 F | WEIGHT: 160 LBS | RESPIRATION RATE: 18 BRPM | DIASTOLIC BLOOD PRESSURE: 72 MMHG | HEIGHT: 67 IN | OXYGEN SATURATION: 100 %

## 2025-05-08 DIAGNOSIS — K08.89 DENTALGIA: ICD-10-CM

## 2025-05-08 DIAGNOSIS — R55 SYNCOPE, UNSPECIFIED SYNCOPE TYPE: Primary | ICD-10-CM

## 2025-05-08 LAB
ALBUMIN SERPL BCP-MCNC: 4.8 G/DL (ref 3.4–5)
ALP SERPL-CCNC: 79 U/L (ref 33–110)
ALT SERPL W P-5'-P-CCNC: 12 U/L (ref 7–45)
ANION GAP SERPL CALC-SCNC: 14 MMOL/L (ref 10–20)
APPEARANCE UR: CLEAR
AST SERPL W P-5'-P-CCNC: 14 U/L (ref 9–39)
BACTERIA #/AREA URNS AUTO: ABNORMAL /HPF
BASOPHILS # BLD AUTO: 0.02 X10*3/UL (ref 0–0.1)
BASOPHILS NFR BLD AUTO: 0.3 %
BILIRUB SERPL-MCNC: 0.5 MG/DL (ref 0–1.2)
BILIRUB UR STRIP.AUTO-MCNC: NEGATIVE MG/DL
BUN SERPL-MCNC: 6 MG/DL (ref 6–23)
CALCIUM SERPL-MCNC: 9.5 MG/DL (ref 8.6–10.3)
CARDIAC TROPONIN I PNL SERPL HS: <3 NG/L (ref 0–13)
CHLORIDE SERPL-SCNC: 106 MMOL/L (ref 98–107)
CO2 SERPL-SCNC: 23 MMOL/L (ref 21–32)
COLOR UR: YELLOW
CREAT SERPL-MCNC: 0.7 MG/DL (ref 0.5–1.05)
D DIMER PPP FEU-MCNC: 921 NG/ML FEU
EGFRCR SERPLBLD CKD-EPI 2021: >90 ML/MIN/1.73M*2
EOSINOPHIL # BLD AUTO: 0.04 X10*3/UL (ref 0–0.7)
EOSINOPHIL NFR BLD AUTO: 0.7 %
ERYTHROCYTE [DISTWIDTH] IN BLOOD BY AUTOMATED COUNT: 12.7 % (ref 11.5–14.5)
GLUCOSE SERPL-MCNC: 116 MG/DL (ref 74–99)
GLUCOSE UR STRIP.AUTO-MCNC: NORMAL MG/DL
HCT VFR BLD AUTO: 39.3 % (ref 36–46)
HGB BLD-MCNC: 13.7 G/DL (ref 12–16)
HOLD SPECIMEN: 293
IMM GRANULOCYTES # BLD AUTO: 0.01 X10*3/UL (ref 0–0.7)
IMM GRANULOCYTES NFR BLD AUTO: 0.2 % (ref 0–0.9)
KETONES UR STRIP.AUTO-MCNC: NEGATIVE MG/DL
LACTATE SERPL-SCNC: 1.1 MMOL/L (ref 0.4–2)
LEUKOCYTE ESTERASE UR QL STRIP.AUTO: NEGATIVE
LYMPHOCYTES # BLD AUTO: 1.14 X10*3/UL (ref 1.2–4.8)
LYMPHOCYTES NFR BLD AUTO: 19.8 %
MAGNESIUM SERPL-MCNC: 1.68 MG/DL (ref 1.6–2.4)
MCH RBC QN AUTO: 32.6 PG (ref 26–34)
MCHC RBC AUTO-ENTMCNC: 34.9 G/DL (ref 32–36)
MCV RBC AUTO: 94 FL (ref 80–100)
MONOCYTES # BLD AUTO: 0.55 X10*3/UL (ref 0.1–1)
MONOCYTES NFR BLD AUTO: 9.6 %
MUCOUS THREADS #/AREA URNS AUTO: ABNORMAL /LPF
NEUTROPHILS # BLD AUTO: 3.99 X10*3/UL (ref 1.2–7.7)
NEUTROPHILS NFR BLD AUTO: 69.4 %
NITRITE UR QL STRIP.AUTO: ABNORMAL
NRBC BLD-RTO: 0 /100 WBCS (ref 0–0)
PH UR STRIP.AUTO: 5.5 [PH]
PLATELET # BLD AUTO: 238 X10*3/UL (ref 150–450)
POTASSIUM SERPL-SCNC: 3.5 MMOL/L (ref 3.5–5.3)
PROT SERPL-MCNC: 7.8 G/DL (ref 6.4–8.2)
PROT UR STRIP.AUTO-MCNC: ABNORMAL MG/DL
RBC # BLD AUTO: 4.2 X10*6/UL (ref 4–5.2)
RBC # UR STRIP.AUTO: ABNORMAL MG/DL
RBC #/AREA URNS AUTO: ABNORMAL /HPF
SODIUM SERPL-SCNC: 139 MMOL/L (ref 136–145)
SP GR UR STRIP.AUTO: 1.04
SQUAMOUS #/AREA URNS AUTO: ABNORMAL /HPF
UROBILINOGEN UR STRIP.AUTO-MCNC: NORMAL MG/DL
WBC # BLD AUTO: 5.8 X10*3/UL (ref 4.4–11.3)
WBC #/AREA URNS AUTO: ABNORMAL /HPF

## 2025-05-08 PROCEDURE — 81001 URINALYSIS AUTO W/SCOPE: CPT | Performed by: REGISTERED NURSE

## 2025-05-08 PROCEDURE — 2500000004 HC RX 250 GENERAL PHARMACY W/ HCPCS (ALT 636 FOR OP/ED): Mod: JZ | Performed by: REGISTERED NURSE

## 2025-05-08 PROCEDURE — 96361 HYDRATE IV INFUSION ADD-ON: CPT

## 2025-05-08 PROCEDURE — 2550000001 HC RX 255 CONTRASTS: Performed by: EMERGENCY MEDICINE

## 2025-05-08 PROCEDURE — 80053 COMPREHEN METABOLIC PANEL: CPT | Performed by: REGISTERED NURSE

## 2025-05-08 PROCEDURE — 84484 ASSAY OF TROPONIN QUANT: CPT | Performed by: REGISTERED NURSE

## 2025-05-08 PROCEDURE — 71046 X-RAY EXAM CHEST 2 VIEWS: CPT | Performed by: RADIOLOGY

## 2025-05-08 PROCEDURE — 96360 HYDRATION IV INFUSION INIT: CPT | Mod: 59

## 2025-05-08 PROCEDURE — 71275 CT ANGIOGRAPHY CHEST: CPT | Performed by: STUDENT IN AN ORGANIZED HEALTH CARE EDUCATION/TRAINING PROGRAM

## 2025-05-08 PROCEDURE — 93005 ELECTROCARDIOGRAM TRACING: CPT

## 2025-05-08 PROCEDURE — 85025 COMPLETE CBC W/AUTO DIFF WBC: CPT | Performed by: REGISTERED NURSE

## 2025-05-08 PROCEDURE — 70450 CT HEAD/BRAIN W/O DYE: CPT

## 2025-05-08 PROCEDURE — 85379 FIBRIN DEGRADATION QUANT: CPT | Performed by: REGISTERED NURSE

## 2025-05-08 PROCEDURE — 99285 EMERGENCY DEPT VISIT HI MDM: CPT | Mod: 25 | Performed by: EMERGENCY MEDICINE

## 2025-05-08 PROCEDURE — 83735 ASSAY OF MAGNESIUM: CPT | Performed by: REGISTERED NURSE

## 2025-05-08 PROCEDURE — 70450 CT HEAD/BRAIN W/O DYE: CPT | Performed by: RADIOLOGY

## 2025-05-08 PROCEDURE — 83605 ASSAY OF LACTIC ACID: CPT | Performed by: REGISTERED NURSE

## 2025-05-08 PROCEDURE — 36415 COLL VENOUS BLD VENIPUNCTURE: CPT | Performed by: REGISTERED NURSE

## 2025-05-08 PROCEDURE — 71275 CT ANGIOGRAPHY CHEST: CPT

## 2025-05-08 PROCEDURE — 87086 URINE CULTURE/COLONY COUNT: CPT | Mod: ELYLAB | Performed by: REGISTERED NURSE

## 2025-05-08 PROCEDURE — 71046 X-RAY EXAM CHEST 2 VIEWS: CPT

## 2025-05-08 RX ORDER — NAPROXEN 500 MG/1
500 TABLET ORAL 2 TIMES DAILY PRN
Qty: 10 TABLET | Refills: 0 | Status: SHIPPED | OUTPATIENT
Start: 2025-05-08 | End: 2025-05-13

## 2025-05-08 RX ORDER — PENICILLIN V POTASSIUM 500 MG/1
500 TABLET, FILM COATED ORAL 4 TIMES DAILY
Qty: 28 TABLET | Refills: 0 | Status: SHIPPED | OUTPATIENT
Start: 2025-05-08 | End: 2025-05-15

## 2025-05-08 RX ADMIN — IOHEXOL 75 ML: 350 INJECTION, SOLUTION INTRAVENOUS at 11:05

## 2025-05-08 RX ADMIN — SODIUM CHLORIDE 1000 ML: 0.9 INJECTION, SOLUTION INTRAVENOUS at 10:05

## 2025-05-08 ASSESSMENT — LIFESTYLE VARIABLES
EVER FELT BAD OR GUILTY ABOUT YOUR DRINKING: NO
HAVE YOU EVER FELT YOU SHOULD CUT DOWN ON YOUR DRINKING: NO
EVER HAD A DRINK FIRST THING IN THE MORNING TO STEADY YOUR NERVES TO GET RID OF A HANGOVER: NO
TOTAL SCORE: 0
HAVE PEOPLE ANNOYED YOU BY CRITICIZING YOUR DRINKING: NO

## 2025-05-08 ASSESSMENT — PAIN SCALES - GENERAL: PAINLEVEL_OUTOF10: 0 - NO PAIN

## 2025-05-08 ASSESSMENT — PAIN - FUNCTIONAL ASSESSMENT
PAIN_FUNCTIONAL_ASSESSMENT: 0-10
PAIN_FUNCTIONAL_ASSESSMENT: 0-10

## 2025-05-08 NOTE — ED PROVIDER NOTES
"HPI   Chief Complaint   Patient presents with    Syncope     Last night \"passed out\" at friends house, friends say she shook and was turning \"blue\", urinated on herself, eyes open the whole time per patient      24-year-old female who denies significant past medical history presents emergency department today for evaluation for seizure.  Yesterday she had been out all day and then she went inside to take a nap.  She tells me after taking a nap she was experiencing right lower dental pain.  Patient tells me that she took a shot of alcohol to relieve her pain.  She tells me about an hour to an hour and a half later she was sitting on a stool.  Patient tells me she leaned forward and then leaned backward and fell off of her stool.  Patient denies losing consciousness.  She tells me that her eyes were open the whole time.  She tells me that her friend saw her shake a couple times.  Friends also said that she turned blue.  Patient reports that she urinated on herself.  Patient denies history of seizures.  Patient tells me she is able to recall the event.  Patient denies any chest pain shortness of breath.  Patient tells me she is currently having her period.  Patient does report alcohol use.  Patient reports tobacco use.  Patient denies drug use.  Patient has no other complaints on arrival.      History provided by:  Patient and medical records   used: No            Patient History   Medical History[1]  Surgical History[2]  Family History[3]  Social History[4]    Physical Exam   ED Triage Vitals [05/08/25 0943]   Temperature Heart Rate Respirations BP   36.8 °C (98.2 °F) (!) 130 18 141/90      Pulse Ox Temp src Heart Rate Source Patient Position   98 % -- -- --      BP Location FiO2 (%)     -- --       Physical Exam  Vitals and nursing note reviewed.   Constitutional:       Appearance: Normal appearance.   HENT:      Head: Normocephalic and atraumatic.      Mouth/Throat:      Mouth: Mucous membranes " are moist.      Dentition: Abnormal dentition. Dental caries present.   Neurological:      Mental Status: She is alert.           ED Course & MDM   Diagnoses as of 05/08/25 1242   Syncope, unspecified syncope type   Dentalgia                 No data recorded     Buhl Coma Scale Score: 15 (05/08/25 0945 : Christie Young, BERTHA)                           Medical Decision Making    Seen examined emergency department; patient is healthy nontoxic appearance not appear in any acute distress.  Patient's lung sounds are clear to auscultation without any adventitious noise.  Heart rate is tachycardic but rhythm is regular without any murmur appreciated.  Skin is warm and dry no diaphoresis noted.  Patient is neurologically intact any focal deficits.  Patient is in a walking boot to her right lower extremity secondary to her previous ankle injury.  Patient's oral cavity is moist palate is soft however patient does have overall poor dentition.  She does have a cracked right lower tooth and this is where her pain is coming from.    Order chest x-ray, EKG and troponin to rule out acute ACS.  I do feel the patient symptoms are syncopal in nature.  Will also order a D-dimer to evaluate the need for PE study as patient is tachycardic.  Will also give patient a fluid bolus and obtain basic labs and urinalysis.    EKG at 09:57 with ventricular rate of 101, as interpreted by me, shows a sinus tachycardia with rightward axis, normal intervals. And normal ST and T wave pattern with no evidence of acute ischemia or other acute findings    D-dimer is elevated at 921 therefore PE study was ordered.    Patient's high sensitive troponin is negative.  CBC and CMP are without significant findings.  Magnesium is 1.68.  Urinalysis does show nitrates but is negative for leukocytes.  CT angio of the chest is negative for PE but does show dependent bilateral lower lobe atelectasis without pneumonia or edema.  Head CT is without intracranial  abnormalities.  Chest x-ray is without active disease.    On reevaluation patient is feeling much better post ministration of fluids.  Patient I discussed that her event yesterday could be cardiac or neuro in nature.  I did recommend following up with cardiologist.  Patient provided with Dr. Cardoza today's contact information and I did recommend to make an appointment.  Patient given naproxen and penicillin for her dental pain.  I did recommend she reach out to the dentist who pulled her last tooth out to have this tooth also extracted.  Patient given return parameters which she verbalized understanding of.  All patient's questions and concerns were addressed prior to discharge.  Patient discharged home in stable condition.    Shared JUDAH Attestation:    I personally saw the patient and made/approved the management plan and take responsibility for the patient management.    History: Patient presenting with passing out episode.    Exam: Tachycardic rate with regular rhythm cardiac exam.  Clear breath sounds bilaterally.  Abdomen is soft and nontender.  Neurological exam is grossly intact.  No palpable cords.    MDM:     Differential Diagnosis: Syncope, ACS, arrhythmia, electrolyte disorder    Labs Reviewed   CBC WITH AUTO DIFFERENTIAL - Abnormal       Result Value    WBC 5.8      nRBC 0.0      RBC 4.20      Hemoglobin 13.7      Hematocrit 39.3      MCV 94      MCH 32.6      MCHC 34.9      RDW 12.7      Platelets 238      Neutrophils % 69.4      Immature Granulocytes %, Automated 0.2      Lymphocytes % 19.8      Monocytes % 9.6      Eosinophils % 0.7      Basophils % 0.3      Neutrophils Absolute 3.99      Immature Granulocytes Absolute, Automated 0.01      Lymphocytes Absolute 1.14 (*)     Monocytes Absolute 0.55      Eosinophils Absolute 0.04      Basophils Absolute 0.02     COMPREHENSIVE METABOLIC PANEL - Abnormal    Glucose 116 (*)     Sodium 139      Potassium 3.5      Chloride 106      Bicarbonate 23      Anion  Gap 14      Urea Nitrogen 6      Creatinine 0.70      eGFR >90      Calcium 9.5      Albumin 4.8      Alkaline Phosphatase 79      Total Protein 7.8      AST 14      Bilirubin, Total 0.5      ALT 12     D-DIMER, VTE EXCLUSION - Abnormal    D-Dimer, Quantitative VTE Exclusion 921 (*)     Narrative:     The VTE Exclusion D-Dimer assay is reported in ng/mL Fibrinogen Equivalent Units (FEU).    Per 's instructions for use, a value of less than 500 ng/mL (FEU) may help to exclude DVT or PE in outpatients when the assay is used with a clinical pretest probability assessment.(AE must utilize and document eCalc 'Wells Score Deep Vein Thrombosis Risk' for DVT exclusion only. Emergency Department should utilize  Guidelines for Emergency Department Use of the VTE Exclusion D-Dimer and Clinical Pretest probability assessment model for DVT or PE exclusion.)   URINALYSIS WITH REFLEX CULTURE AND MICROSCOPIC - Abnormal    Color, Urine Yellow      Appearance, Urine Clear      Specific Gravity, Urine 1.036 (*)     pH, Urine 5.5      Protein, Urine 10 (TRACE)      Glucose, Urine Normal      Blood, Urine 0.5 (2+) (*)     Ketones, Urine NEGATIVE      Bilirubin, Urine NEGATIVE      Urobilinogen, Urine Normal      Nitrite, Urine 1+ (*)     Leukocyte Esterase, Urine NEGATIVE     MICROSCOPIC ONLY, URINE - Abnormal    WBC, Urine 1-5      RBC, Urine 3-5      Squamous Epithelial Cells, Urine 1-9 (SPARSE)      Bacteria, Urine 2+ (*)     Mucus, Urine FEW     MAGNESIUM - Normal    Magnesium 1.68     LACTATE - Normal    Lactate 1.1      Narrative:     Venipuncture immediately after or during the administration of Metamizole may lead to falsely low results. Testing should be performed immediately prior to Metamizole dosing.   TROPONIN I, HIGH SENSITIVITY - Normal    Troponin I, High Sensitivity <3      Narrative:     Less than 99th percentile of normal range cutoff-  Female and children under 18 years old <14 ng/L; Male <21 ng/L:  Negative  Repeat testing should be performed if clinically indicated.     Female and children under 18 years old 14-50 ng/L; Male 21-50 ng/L:  Consistent with possible cardiac damage and possible increased clinical   risk. Serial measurements may help to assess extent of myocardial damage.     >50 ng/L: Consistent with cardiac damage, increased clinical risk and  myocardial infarction. Serial measurements may help assess extent of   myocardial damage.      NOTE: Children less than 1 year old may have higher baseline troponin   levels and results should be interpreted in conjunction with the overall   clinical context.     NOTE: Troponin I testing is performed using a different   testing methodology at [facility] than at other   [facility]. Direct result comparisons should only   be made within the same method.   URINE CULTURE   URINALYSIS WITH REFLEX CULTURE AND MICROSCOPIC    Narrative:     The following orders were created for panel order Urinalysis with Reflex Culture and Microscopic.  Procedure                               Abnormality         Status                     ---------                               -----------         ------                     Urinalysis with Reflex C...[[id number]]  Abnormal            Final result               Extra Urine Gray Tube[[id number]]                            In process                   Please view results for these tests on the individual orders.   EXTRA URINE GRAY TUBE       CT angio chest for pulmonary embolism   Final Result   1. No evidence of acute pulmonary embolism.   2. Mild dependent bilateral lower lobe atelectasis without evidence   of pneumonia or pulmonary edema.   3. Additional chronic and/or ancillary findings detailed above.        MACRO:   None        Signed by: Celso Cm 5/8/2025 11:23 AM   Dictation workstation:   [id number]      CT head wo IV contrast   Final Result   No acute intracranial abnormality. Consider follow-up  with MRI as   warranted.             Signed by: Eveline Santiago 5/8/2025 10:35 AM   Dictation workstation:   [id number]      XR chest 2 views   Final Result   No active disease in the chest identified.        MACRO:   None        Signed by: Riki Stoddard 5/8/2025 10:24 AM   Dictation workstation:   [id number]              Dany Taveras MD        Procedure  Procedures         [1] No past medical history on file.  [2] No past surgical history on file.  [3] No family history on file.  [4]   Social History  Tobacco Use    Smoking status: Not on file    Smokeless tobacco: Not on file   Substance Use Topics    Alcohol use: Not on file    Drug use: Not on file        So Acharya, APRN-CNP  05/08/25 0455

## 2025-05-08 NOTE — ED TRIAGE NOTES
"Pt to ED for c/o syncopal episode with concern for seizure, no hx.  Pt states last night she was at a friends house after being outside all day and had tooth pain, so her friend offered her a \"shot\" of alcohol.  Pt states an hour later she experienced a syncopal episode, shook a few times per friends, and urinated on herself.  Pt denies any drug or additional ETOH use.  Respirations even and unlabored.  No acute distress noted at this time.  Denies CP and SOB.  A&Ox4.  VSS.      So NP in triage.  Per So NP place patient in chairs in .  "
Universal Safety Interventions

## 2025-05-09 LAB
ATRIAL RATE: 101 BPM
P AXIS: 67 DEGREES
P OFFSET: 190 MS
P ONSET: 142 MS
PR INTERVAL: 158 MS
Q ONSET: 221 MS
QRS COUNT: 17 BEATS
QRS DURATION: 80 MS
QT INTERVAL: 338 MS
QTC CALCULATION(BAZETT): 438 MS
QTC FREDERICIA: 402 MS
R AXIS: 90 DEGREES
T AXIS: 56 DEGREES
T OFFSET: 390 MS
VENTRICULAR RATE: 101 BPM

## 2025-05-10 LAB — BACTERIA UR CULT: ABNORMAL

## 2025-05-13 ENCOUNTER — TELEPHONE (OUTPATIENT)
Dept: PHARMACY | Facility: HOSPITAL | Age: 25
End: 2025-05-13
Payer: MEDICAID

## 2025-05-13 NOTE — PROGRESS NOTES
EDPD Note: Rapid Result Review    I reviewed Nadiya Woodard 's chart regarding a positive urine culture/result that was taken during their recent emergency room visit. The patient was not told about these results prior to leaving the emergency department. Therefore, patient was contacted and given appropriate education. Patient was asymptomatic in ED and still reports no urinary symptoms at the time of call. Since asymptomatic UTI, patient is not indicated for additional antibiotic at this time. Patient was told to continue taking Penicillin V for dental pain. Advised patient to follow up with PCP or urgent care if urinary symptoms due arise.     Susceptibility data from last 90 days.  Collected Specimen Info Organism Amoxicillin/Clavulanate Ampicillin Ampicillin/Sulbactam Cefazolin Cefazolin (uncomplicated UTIs only) Ceftriaxone Ciprofloxacin Gentamicin Levofloxacin Nitrofurantoin Piperacillin/Tazobactam   05/08/25 Urine from Clean Catch/Voided Escherichia coli  I  R  R  R  S  S  S  S  S  S  R     Collected Specimen Info Organism Trimethoprim/Sulfamethoxazole   05/08/25 Urine from Clean Catch/Voided Escherichia coli  S     Admission on 05/08/2025, Discharged on 05/08/2025   Component Date Value Ref Range Status    Ventricular Rate 05/08/2025 101  BPM Final    Atrial Rate 05/08/2025 101  BPM Final    MD Interval 05/08/2025 158  ms Final    QRS Duration 05/08/2025 80  ms Final    QT Interval 05/08/2025 338  ms Final    QTC Calculation(Bazett) 05/08/2025 438  ms Final    P Axis 05/08/2025 67  degrees Final    R Axis 05/08/2025 90  degrees Final    T Axis 05/08/2025 56  degrees Final    QRS Count 05/08/2025 17  beats Final    Q Onset 05/08/2025 221  ms Final    P Onset 05/08/2025 142  ms Final    P Offset 05/08/2025 190  ms Final    T Offset 05/08/2025 390  ms Final    QTC Fredericia 05/08/2025 402  ms Final    WBC 05/08/2025 5.8  4.4 - 11.3 x10*3/uL Final    nRBC 05/08/2025 0.0  0.0 - 0.0 /100 WBCs Final    RBC  05/08/2025 4.20  4.00 - 5.20 x10*6/uL Final    Hemoglobin 05/08/2025 13.7  12.0 - 16.0 g/dL Final    Hematocrit 05/08/2025 39.3  36.0 - 46.0 % Final    MCV 05/08/2025 94  80 - 100 fL Final    MCH 05/08/2025 32.6  26.0 - 34.0 pg Final    MCHC 05/08/2025 34.9  32.0 - 36.0 g/dL Final    RDW 05/08/2025 12.7  11.5 - 14.5 % Final    Platelets 05/08/2025 238  150 - 450 x10*3/uL Final    Neutrophils % 05/08/2025 69.4  40.0 - 80.0 % Final    Immature Granulocytes %, Automated 05/08/2025 0.2  0.0 - 0.9 % Final    Immature Granulocyte Count (IG) includes promyelocytes, myelocytes and metamyelocytes but does not include bands. Percent differential counts (%) should be interpreted in the context of the absolute cell counts (cells/UL).    Lymphocytes % 05/08/2025 19.8  13.0 - 44.0 % Final    Monocytes % 05/08/2025 9.6  2.0 - 10.0 % Final    Eosinophils % 05/08/2025 0.7  0.0 - 6.0 % Final    Basophils % 05/08/2025 0.3  0.0 - 2.0 % Final    Neutrophils Absolute 05/08/2025 3.99  1.20 - 7.70 x10*3/uL Final    Percent differential counts (%) should be interpreted in the context of the absolute cell counts (cells/uL).    Immature Granulocytes Absolute, Au* 05/08/2025 0.01  0.00 - 0.70 x10*3/uL Final    Lymphocytes Absolute 05/08/2025 1.14 (L)  1.20 - 4.80 x10*3/uL Final    Monocytes Absolute 05/08/2025 0.55  0.10 - 1.00 x10*3/uL Final    Eosinophils Absolute 05/08/2025 0.04  0.00 - 0.70 x10*3/uL Final    Basophils Absolute 05/08/2025 0.02  0.00 - 0.10 x10*3/uL Final    Magnesium 05/08/2025 1.68  1.60 - 2.40 mg/dL Final    Glucose 05/08/2025 116 (H)  74 - 99 mg/dL Final    Sodium 05/08/2025 139  136 - 145 mmol/L Final    Potassium 05/08/2025 3.5  3.5 - 5.3 mmol/L Final    Chloride 05/08/2025 106  98 - 107 mmol/L Final    Bicarbonate 05/08/2025 23  21 - 32 mmol/L Final    Anion Gap 05/08/2025 14  10 - 20 mmol/L Final    Urea Nitrogen 05/08/2025 6  6 - 23 mg/dL Final    Creatinine 05/08/2025 0.70  0.50 - 1.05 mg/dL Final    eGFR  05/08/2025 >90  >60 mL/min/1.73m*2 Final    Calculations of estimated GFR are performed using the 2021 CKD-EPI Study Refit equation without the race variable for the IDMS-Traceable creatinine methods.  https://jasn.asnjournals.org/content/early/2021/09/22/ASN.4345008178    Calcium 05/08/2025 9.5  8.6 - 10.3 mg/dL Final    Albumin 05/08/2025 4.8  3.4 - 5.0 g/dL Final    Alkaline Phosphatase 05/08/2025 79  33 - 110 U/L Final    Total Protein 05/08/2025 7.8  6.4 - 8.2 g/dL Final    AST 05/08/2025 14  9 - 39 U/L Final    Bilirubin, Total 05/08/2025 0.5  0.0 - 1.2 mg/dL Final    ALT 05/08/2025 12  7 - 45 U/L Final    Patients treated with Sulfasalazine may generate falsely decreased results for ALT.    Lactate 05/08/2025 1.1  0.4 - 2.0 mmol/L Final    Troponin I, High Sensitivity 05/08/2025 <3  0 - 13 ng/L Final    D-Dimer, Quantitative VTE Exclusion 05/08/2025 921 (H)  <=500 ng/mL FEU Final    Color, Urine 05/08/2025 Yellow  Light-Yellow, Yellow, Dark-Yellow Final    Appearance, Urine 05/08/2025 Clear  Clear Final    Specific Gravity, Urine 05/08/2025 1.036 (N)  1.005 - 1.035 Final    pH, Urine 05/08/2025 5.5  5.0, 5.5, 6.0, 6.5, 7.0, 7.5, 8.0 Final    Protein, Urine 05/08/2025 10 (TRACE)  NEGATIVE, 10 (TRACE), 20 (TRACE) mg/dL Final    Glucose, Urine 05/08/2025 Normal  Normal mg/dL Final    Blood, Urine 05/08/2025 0.5 (2+) (A)  NEGATIVE mg/dL Final    Ketones, Urine 05/08/2025 NEGATIVE  NEGATIVE mg/dL Final    Bilirubin, Urine 05/08/2025 NEGATIVE  NEGATIVE mg/dL Final    Urobilinogen, Urine 05/08/2025 Normal  Normal mg/dL Final    Nitrite, Urine 05/08/2025 1+ (A)  NEGATIVE Final    Leukocyte Esterase, Urine 05/08/2025 NEGATIVE  NEGATIVE Final    Extra Tube 05/08/2025 293   Final    WBC, Urine 05/08/2025 1-5  1-5, NONE /HPF Final    RBC, Urine 05/08/2025 3-5  NONE, 1-2, 3-5 /HPF Final    Squamous Epithelial Cells, Urine 05/08/2025 1-9 (SPARSE)  Reference range not established. /HPF Final    Bacteria, Urine 05/08/2025 2+  (A)  NONE SEEN /HPF Final    Mucus, Urine 05/08/2025 FEW  Reference range not established. /LPF Final    Urine Culture 05/08/2025 20,000 - 80,000 CFU/mL Escherichia coli (A)   Corrected       No further follow up needed from EDPD Team.     If there are any other questions for the ED Post-Discharge Culture Follow Up Team, please contact [phone]. Fax: [phone].    Yumiko Vasquez, PharmD

## 2025-05-15 ENCOUNTER — APPOINTMENT (OUTPATIENT)
Dept: ORTHOPEDIC SURGERY | Facility: CLINIC | Age: 25
End: 2025-05-15
Payer: MEDICAID

## 2025-05-15 ENCOUNTER — HOSPITAL ENCOUNTER (OUTPATIENT)
Dept: RADIOLOGY | Facility: HOSPITAL | Age: 25
Discharge: HOME | End: 2025-05-15
Payer: MEDICAID

## 2025-05-15 DIAGNOSIS — S82.301A CLOSED FRACTURE OF DISTAL END OF RIGHT TIBIA, UNSPECIFIED FRACTURE MORPHOLOGY, INITIAL ENCOUNTER: ICD-10-CM

## 2025-05-15 PROCEDURE — 73610 X-RAY EXAM OF ANKLE: CPT | Mod: RT

## 2025-05-15 PROCEDURE — 73610 X-RAY EXAM OF ANKLE: CPT | Mod: RIGHT SIDE | Performed by: RADIOLOGY

## 2025-05-15 NOTE — PROGRESS NOTES
Follow-Up Patient Visit  Patient ID: Nadiya Woodard is a 24 y.o. female.    History of Present Illness  The patient is a 24-year-old female who presents for a 5.5-week follow-up of a right nondisplaced distal tibia, medial malleolus fracture.    Right Nondisplaced Distal Tibia, Medial Malleolus Fracture  - Nonweightbearing and in a tall boot since last visit  - Reports satisfactory recovery with adherence to nonweightbearing protocol  - Mild tenderness attributed to residual bruising  - No significant pain, complications, or falls    Assessment & Plan  1. Right nondisplaced distal tibia, medial malleolus fracture:  - X-ray indicates satisfactory healing  - Gradual transition to weightbearing as tolerated with boot over next 10 days  - Use crutches if pain occurs  - At 7 weeks, consider transitioning to lace-up ankle brace, followed by a couple more weeks in brace  - Follow-up with x-rays in 10-14 days to ensure proper healing before discontinuing boot    Follow-up  - Follow up in 10-14 days       Orders Placed This Encounter    XR ankle right 3+ views       1. Closed fracture of distal end of right tibia, unspecified fracture morphology, initial encounter        Procedures    Physical Exam  - Musculoskeletal:    - Right Foot:      - Minimal tenderness over distal tibia      - Mild bruising in toes    Results  Imaging   - X-ray of right distal tibia and medial malleolus: Fracture continues to heal well. No new findings or displacement.    At the conclusion of the visit there were no further questions by the patient/family regarding their plan of care.  Patient was instructed to call or return with any issues, questions, or concerns regarding their injury and/or treatment plan described above.      This medical note was created with the assistance of artificial intelligence (AI) for documentation purposes. The content has been reviewed and confirmed by the healthcare provider for accuracy and completeness.  Patient consented to the use of audio recording and use of AI during their visit.   05/15/25 at 4:14 PM - Duong Monroy, DO  Office:  [phone]

## 2025-05-27 ENCOUNTER — APPOINTMENT (OUTPATIENT)
Dept: ORTHOPEDIC SURGERY | Facility: CLINIC | Age: 25
End: 2025-05-27
Payer: MEDICAID

## 2025-05-27 ENCOUNTER — HOSPITAL ENCOUNTER (OUTPATIENT)
Dept: RADIOLOGY | Facility: HOSPITAL | Age: 25
Discharge: HOME | End: 2025-05-27
Payer: MEDICAID

## 2025-05-27 DIAGNOSIS — S82.51XA CLOSED AVULSION FRACTURE OF MEDIAL MALLEOLUS OF RIGHT TIBIA, INITIAL ENCOUNTER: ICD-10-CM

## 2025-05-27 DIAGNOSIS — S82.51XA CLOSED AVULSION FRACTURE OF MEDIAL MALLEOLUS OF RIGHT TIBIA, INITIAL ENCOUNTER: Primary | ICD-10-CM

## 2025-05-27 PROCEDURE — L1902 AFO ANKLE GAUNTLET PRE OTS: HCPCS | Performed by: STUDENT IN AN ORGANIZED HEALTH CARE EDUCATION/TRAINING PROGRAM

## 2025-05-27 PROCEDURE — 73610 X-RAY EXAM OF ANKLE: CPT | Mod: RIGHT SIDE | Performed by: STUDENT IN AN ORGANIZED HEALTH CARE EDUCATION/TRAINING PROGRAM

## 2025-05-27 PROCEDURE — 99024 POSTOP FOLLOW-UP VISIT: CPT | Performed by: STUDENT IN AN ORGANIZED HEALTH CARE EDUCATION/TRAINING PROGRAM

## 2025-05-27 PROCEDURE — 73610 X-RAY EXAM OF ANKLE: CPT | Mod: RT

## 2025-05-27 NOTE — PROGRESS NOTES
Follow-Up Patient Visit  Patient ID: Nadiya Woodard is a 24 y.o. female.    History of Present Illness  The patient is a 24-year-old female who is here for a follow-up of a nondisplaced distal tibia medial malleolus fracture, approximately 7-1/2 weeks post-injury. She is here for repeat x-rays and to consider transitioning to a lace-up ankle brace. At the last visit, she was taken out of the cast and began gradual weightbearing with a boot.    Pain with weightbearing  - Reports overall improvement but pain with weightbearing  - Monitors skin condition, applies ice, removes boot during sleep  - Yesterday, removed boot post-shower, difficulty with dorsiflexion, heel discomfort    Resumed work  - Resumed work, performs duties with boot  - Maintains flat-footed gait due to boot    Assessment & Plan  1. Nondisplaced distal tibia medial malleolus fracture:  - Provide lace-up ankle brace  - Transition from boot to brace as comfort permits over next week  - Avoid high-energy activities; gradually resume normal activities  - Use ankle brace during work, boot available if needed  - Initiate formal physical therapy for strength and mobility  - Obtain x-rays if not improving at next visit    Follow-up:  - Follow up in 3 to 4 weeks, no repeat x-rays unless she is not better       Orders Placed This Encounter    Ankle Brace, Lace Up or A60    XR ankle right 3+ views    Referral to Physical Therapy       1. Closed avulsion fracture of medial malleolus of right tibia, initial encounter        Procedures    Physical Exam  - Musculoskeletal:    - Distal tibia medial malleolus: mild swelling, minimal tenderness over fracture site    Results  X-ray of distal tibia medial malleolus (05/27/2025): Excellent interval healing. No new fractures or dislocations.    At the conclusion of the visit there were no further questions by the patient/family regarding their plan of care.  Patient was instructed to call or return with any  issues, questions, or concerns regarding their injury and/or treatment plan described above.      This medical note was created with the assistance of artificial intelligence (AI) for documentation purposes. The content has been reviewed and confirmed by the healthcare provider for accuracy and completeness. Patient consented to the use of audio recording and use of AI during their visit.   05/27/25 at 3:36 PM - Duong Monroy DO  Office:  [phone]

## 2025-06-17 ENCOUNTER — APPOINTMENT (OUTPATIENT)
Dept: ORTHOPEDIC SURGERY | Facility: CLINIC | Age: 25
End: 2025-06-17
Payer: MEDICAID

## 2025-08-13 ENCOUNTER — APPOINTMENT (OUTPATIENT)
Dept: OBSTETRICS AND GYNECOLOGY | Facility: CLINIC | Age: 25
End: 2025-08-13
Payer: MEDICAID

## 2025-08-13 VITALS — WEIGHT: 173 LBS | DIASTOLIC BLOOD PRESSURE: 75 MMHG | SYSTOLIC BLOOD PRESSURE: 123 MMHG | BODY MASS INDEX: 27.1 KG/M2

## 2025-08-13 DIAGNOSIS — Z34.90 PREGNANCY, UNSPECIFIED GESTATIONAL AGE (HHS-HCC): ICD-10-CM

## 2025-08-13 DIAGNOSIS — Z3A.09 9 WEEKS GESTATION OF PREGNANCY (HHS-HCC): Primary | ICD-10-CM

## 2025-08-13 PROBLEM — Z86.59 HISTORY OF DEPRESSION: Status: ACTIVE | Noted: 2025-08-13

## 2025-08-13 LAB — PREGNANCY TEST URINE, POC: POSITIVE

## 2025-08-13 PROCEDURE — 99408 AUDIT/DAST 15-30 MIN: CPT

## 2025-08-13 PROCEDURE — 99204 OFFICE O/P NEW MOD 45 MIN: CPT

## 2025-08-13 PROCEDURE — 81025 URINE PREGNANCY TEST: CPT

## 2025-08-13 RX ORDER — VIT C/E/ZN/COPPR/LUTEIN/ZEAXAN 250MG-90MG
1 CAPSULE ORAL DAILY
COMMUNITY
Start: 2020-04-27

## 2025-08-13 ASSESSMENT — EDINBURGH POSTNATAL DEPRESSION SCALE (EPDS)
I HAVE BEEN SO UNHAPPY THAT I HAVE HAD DIFFICULTY SLEEPING: NOT AT ALL
I HAVE BEEN SO UNHAPPY THAT I HAVE BEEN CRYING: ONLY OCCASIONALLY
I HAVE FELT SCARED OR PANICKY FOR NO GOOD REASON: NO, NOT AT ALL
THE THOUGHT OF HARMING MYSELF HAS OCCURRED TO ME: NEVER
I HAVE BEEN ABLE TO LAUGH AND SEE THE FUNNY SIDE OF THINGS: AS MUCH AS I ALWAYS COULD
THINGS HAVE BEEN GETTING ON TOP OF ME: NO, I HAVE BEEN COPING AS WELL AS EVER
I HAVE FELT SAD OR MISERABLE: NOT VERY OFTEN
I HAVE LOOKED FORWARD WITH ENJOYMENT TO THINGS: AS MUCH AS I EVER DID
TOTAL SCORE: 2
I HAVE BLAMED MYSELF UNNECESSARILY WHEN THINGS WENT WRONG: NO, NEVER
I HAVE BEEN ANXIOUS OR WORRIED FOR NO GOOD REASON: NO, NOT AT ALL

## 2025-08-16 LAB
BACTERIA UR CULT: ABNORMAL
C TRACH RRNA SPEC QL NAA+PROBE: NOT DETECTED
N GONORRHOEA RRNA SPEC QL NAA+PROBE: NOT DETECTED
QUEST GC CT AMPLIFIED (ALWAYS MESSAGE): NORMAL

## 2025-09-05 ENCOUNTER — APPOINTMENT (OUTPATIENT)
Dept: RADIOLOGY | Facility: CLINIC | Age: 25
End: 2025-09-05
Payer: MEDICAID

## 2025-09-11 ENCOUNTER — APPOINTMENT (OUTPATIENT)
Dept: OBSTETRICS AND GYNECOLOGY | Facility: CLINIC | Age: 25
End: 2025-09-11
Payer: MEDICAID

## 2025-10-06 ENCOUNTER — APPOINTMENT (OUTPATIENT)
Dept: OBSTETRICS AND GYNECOLOGY | Facility: CLINIC | Age: 25
End: 2025-10-06
Payer: MEDICAID

## 2025-11-04 ENCOUNTER — APPOINTMENT (OUTPATIENT)
Dept: OBSTETRICS AND GYNECOLOGY | Facility: CLINIC | Age: 25
End: 2025-11-04
Payer: MEDICAID

## 2025-12-02 ENCOUNTER — APPOINTMENT (OUTPATIENT)
Dept: OBSTETRICS AND GYNECOLOGY | Facility: CLINIC | Age: 25
End: 2025-12-02
Payer: MEDICAID

## 2025-12-23 ENCOUNTER — APPOINTMENT (OUTPATIENT)
Dept: OBSTETRICS AND GYNECOLOGY | Facility: CLINIC | Age: 25
End: 2025-12-23
Payer: MEDICAID